# Patient Record
Sex: FEMALE | Race: WHITE | NOT HISPANIC OR LATINO | Employment: OTHER
[De-identification: names, ages, dates, MRNs, and addresses within clinical notes are randomized per-mention and may not be internally consistent; named-entity substitution may affect disease eponyms.]

---

## 2022-03-14 ENCOUNTER — OFFICE VISIT (OUTPATIENT)
Dept: INTERNAL MEDICINE | Facility: OTHER | Age: 52
End: 2022-03-14
Payer: COMMERCIAL

## 2022-03-14 VITALS
HEIGHT: 66 IN | HEART RATE: 125 BPM | BODY MASS INDEX: 47.09 KG/M2 | OXYGEN SATURATION: 97 % | WEIGHT: 293 LBS | TEMPERATURE: 96.9 F | SYSTOLIC BLOOD PRESSURE: 127 MMHG | DIASTOLIC BLOOD PRESSURE: 78 MMHG

## 2022-03-14 DIAGNOSIS — L05.91 PILONIDAL CYST: ICD-10-CM

## 2022-03-14 DIAGNOSIS — Z76.89 ENCOUNTER TO ESTABLISH CARE: ICD-10-CM

## 2022-03-14 DIAGNOSIS — J45.901 REACTIVE AIRWAY DISEASE WITH ACUTE EXACERBATION, UNSPECIFIED ASTHMA SEVERITY, UNSPECIFIED WHETHER PERSISTENT: ICD-10-CM

## 2022-03-14 DIAGNOSIS — Z00.00 PREVENTATIVE HEALTH CARE: ICD-10-CM

## 2022-03-14 DIAGNOSIS — E11.9 TYPE 2 DIABETES MELLITUS WITHOUT COMPLICATION, WITHOUT LONG-TERM CURRENT USE OF INSULIN (HCC): ICD-10-CM

## 2022-03-14 DIAGNOSIS — J30.89 ENVIRONMENTAL AND SEASONAL ALLERGIES: ICD-10-CM

## 2022-03-14 DIAGNOSIS — Z87.898 HISTORY OF CANNABIS VAPING: ICD-10-CM

## 2022-03-14 DIAGNOSIS — E78.5 DYSLIPIDEMIA: ICD-10-CM

## 2022-03-14 DIAGNOSIS — I10 PRIMARY HYPERTENSION: ICD-10-CM

## 2022-03-14 DIAGNOSIS — Z12.31 ENCOUNTER FOR SCREENING MAMMOGRAM FOR BREAST CANCER: ICD-10-CM

## 2022-03-14 DIAGNOSIS — E66.01 CLASS 3 SEVERE OBESITY DUE TO EXCESS CALORIES WITH SERIOUS COMORBIDITY AND BODY MASS INDEX (BMI) OF 50.0 TO 59.9 IN ADULT (HCC): ICD-10-CM

## 2022-03-14 PROCEDURE — 99204 OFFICE O/P NEW MOD 45 MIN: CPT | Mod: GC | Performed by: STUDENT IN AN ORGANIZED HEALTH CARE EDUCATION/TRAINING PROGRAM

## 2022-03-14 RX ORDER — LANCETS 33 GAUGE
EACH MISCELLANEOUS
COMMUNITY
Start: 2022-03-09

## 2022-03-14 RX ORDER — PREDNISONE 50 MG/1
TABLET ORAL
COMMUNITY
Start: 2022-03-09 | End: 2022-03-14

## 2022-03-14 RX ORDER — LOSARTAN POTASSIUM 50 MG/1
50 TABLET ORAL DAILY
COMMUNITY
Start: 2022-03-09 | End: 2022-04-07 | Stop reason: SDUPTHER

## 2022-03-14 RX ORDER — INSULIN HUMAN 100 [IU]/ML
INJECTION, SUSPENSION SUBCUTANEOUS
COMMUNITY
Start: 2022-03-11 | End: 2022-03-15

## 2022-03-14 RX ORDER — CLINDAMYCIN HYDROCHLORIDE 300 MG/1
CAPSULE ORAL
COMMUNITY
Start: 2022-03-09 | End: 2022-03-15

## 2022-03-14 RX ORDER — FLUTICASONE PROPIONATE 50 MCG
SPRAY, SUSPENSION (ML) NASAL
COMMUNITY
Start: 2022-03-09

## 2022-03-14 RX ORDER — ALBUTEROL SULFATE 90 UG/1
AEROSOL, METERED RESPIRATORY (INHALATION)
COMMUNITY
Start: 2022-03-09 | End: 2022-04-07 | Stop reason: SDUPTHER

## 2022-03-14 RX ORDER — ATORVASTATIN CALCIUM 10 MG/1
10 TABLET, FILM COATED ORAL
COMMUNITY
Start: 2022-03-09 | End: 2022-04-07 | Stop reason: SDUPTHER

## 2022-03-14 RX ORDER — BLOOD-GLUCOSE METER
EACH MISCELLANEOUS
COMMUNITY
Start: 2022-03-09

## 2022-03-14 RX ORDER — INSULIN LISPRO 100 [IU]/ML
5 INJECTION, SOLUTION INTRAVENOUS; SUBCUTANEOUS
COMMUNITY
Start: 2022-03-09

## 2022-03-14 RX ORDER — PEN NEEDLE, DIABETIC 32GX 5/32"
NEEDLE, DISPOSABLE MISCELLANEOUS
COMMUNITY
Start: 2022-03-09 | End: 2022-07-12 | Stop reason: RX

## 2022-03-14 RX ORDER — MONTELUKAST SODIUM 10 MG/1
10 TABLET ORAL DAILY
COMMUNITY
Start: 2022-03-09 | End: 2022-04-07 | Stop reason: SDUPTHER

## 2022-03-14 RX ORDER — BLOOD SUGAR DIAGNOSTIC
STRIP MISCELLANEOUS
COMMUNITY
Start: 2022-03-09 | End: 2022-07-28 | Stop reason: SDUPTHER

## 2022-03-14 RX ORDER — CLINDAMYCIN HYDROCHLORIDE 150 MG/1
CAPSULE ORAL
COMMUNITY
Start: 2022-02-22 | End: 2022-03-14

## 2022-03-14 ASSESSMENT — PATIENT HEALTH QUESTIONNAIRE - PHQ9: CLINICAL INTERPRETATION OF PHQ2 SCORE: 0

## 2022-03-14 NOTE — PATIENT INSTRUCTIONS
"1. Diabetes: We are going to change from \"humulin\" to \"lantus\" which is a longer acting insulin you will still take in the morning. You will increase that dosage from 35U to 40U every morning. Continue to take 10U every meal, please watch your glucose and call if your glucose is running <120 consistently.  2. We will start a medication called metformin at 500 mg twice a day, it may cause some upset stomach and diarrhea which does improve often on its own, but if it does not please let us know and we will stop it. After 1 month, we will increase that dosage to 1000 mg BID for now  3. For your breathing issues, we are unsure if this truly represents asthma. We are going to order some pulmonary function tests to clarify this diagnosis and if you don't get a call for an appt in a week, please call to let us know  4. For your breathing medications, we want you to use albuterol as needed every 6 hours for shortness of breath or wheezing/breathing hard and take a log of how often you are using it during the day and at night.  5. Please work on exercise and diet as you can  6. See wound care in next few days  7. We will see you in 1 week, you can get flu and TDAP shots if you are still well after that appointment. Bring in a log of your blood pressures once in the morning and once in the afternoon to that appointment and with your cuff.  "
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
## 2022-03-15 ENCOUNTER — TELEPHONE (OUTPATIENT)
Dept: INTERNAL MEDICINE | Facility: OTHER | Age: 52
End: 2022-03-15

## 2022-03-15 PROBLEM — Z87.898 HISTORY OF CANNABIS VAPING: Status: ACTIVE | Noted: 2022-03-15

## 2022-03-15 PROBLEM — E11.9 TYPE 2 DIABETES MELLITUS WITHOUT COMPLICATION, WITHOUT LONG-TERM CURRENT USE OF INSULIN (HCC): Status: ACTIVE | Noted: 2022-03-15

## 2022-03-15 PROBLEM — J45.901 REACTIVE AIRWAY DISEASE WITH ACUTE EXACERBATION: Status: ACTIVE | Noted: 2022-03-15

## 2022-03-15 PROBLEM — E66.01 CLASS 3 SEVERE OBESITY DUE TO EXCESS CALORIES WITH SERIOUS COMORBIDITY AND BODY MASS INDEX (BMI) OF 50.0 TO 59.9 IN ADULT (HCC): Status: ACTIVE | Noted: 2022-03-15

## 2022-03-15 PROBLEM — E78.5 DYSLIPIDEMIA: Status: ACTIVE | Noted: 2022-03-15

## 2022-03-15 PROBLEM — I10 PRIMARY HYPERTENSION: Status: ACTIVE | Noted: 2022-03-15

## 2022-03-15 PROBLEM — Z00.00 PREVENTATIVE HEALTH CARE: Status: ACTIVE | Noted: 2022-03-15

## 2022-03-15 PROBLEM — L05.91 PILONIDAL CYST: Status: ACTIVE | Noted: 2022-03-15

## 2022-03-15 PROBLEM — J30.89 ENVIRONMENTAL AND SEASONAL ALLERGIES: Status: ACTIVE | Noted: 2022-03-15

## 2022-03-15 RX ORDER — INSULIN GLARGINE 100 [IU]/ML
40 INJECTION, SOLUTION SUBCUTANEOUS EVERY MORNING
Qty: 3 ML | Refills: 2 | Status: SHIPPED | OUTPATIENT
Start: 2022-03-15 | End: 2022-03-22 | Stop reason: SDUPTHER

## 2022-03-15 ASSESSMENT — ENCOUNTER SYMPTOMS
DEPRESSION: 0
COUGH: 0
FEVER: 0
SHORTNESS OF BREATH: 0
NERVOUS/ANXIOUS: 1
WEIGHT LOSS: 1
CHILLS: 0
WHEEZING: 1

## 2022-03-15 NOTE — ASSESSMENT & PLAN NOTE
Patient has severe obesity with BMI greater than 50  -This is complicated by type 2 diabetes, is unclear if this is complicated by dyslipidemia and hypertension    Plan:  -We will follow complications in future visits  -Discussed with patient regarding diet and exercise

## 2022-03-15 NOTE — ASSESSMENT & PLAN NOTE
Patient has a lesion on her left buttock gluteal fold most consistent with pilonidal cyst, has been finishing antibiotics and does not appear to be active infection  -However given diabetes, high risk for infection and transition to abscess.  She has had this before and it went away on its own afterr time    Plan:  -Wound care to follow  -If significant worsening or recurrence, will definitely need colorectal surgery for assessment

## 2022-03-15 NOTE — ASSESSMENT & PLAN NOTE
No lipid panel noted on [facility] discharge  -Patient put on atorvastatin 10 mg, has no ASCVD history but does have new diagnosis of diabetes    Plan:  -We will repeat lipid panel in 3-6 months and titrate statin to ASCVD risk score

## 2022-03-15 NOTE — ASSESSMENT & PLAN NOTE
Mammogram to be done in November when patient turns 52, Pap smear will need to be done soon as unable to get records according to patient    Tdap and flu patient plans to get sometime next week

## 2022-03-15 NOTE — ASSESSMENT & PLAN NOTE
Patient has new diagnosis of type 2 diabetes (presumptive) with A1c of 14 documented 3/6/2022 without prior history  -She just finished steroid course for reactive airway disease, her glucose runs above 275 even on current insulin however is lower in the afternoon  -Diabetes educator in the hospital, on 10 units Humalog per meal and 35 units Humulin in the morning, has blood glucose supplies  -Patient has no feet pain and denies ulcers, does denies any polyurea/polydipsia, has no visual issues  -Sees optometrist, last was 6 months ago    Plan:  -We will replace Humulin KwikPen with Lantus pen but increased dose from 35 units to 40 units  -We will start metformin at 500 mg BID for 1 month then increase to 1000 mg BID thereafter  -We will follow up with optometrist as soon as possible for diabetic eye exam  -We will need foot exam in the future  -We will order microalbumin to creatinine ratio  -Good candidate for GLP1 in 3 months with eventual goal to wean off insulin

## 2022-03-15 NOTE — TELEPHONE ENCOUNTER
Metformin Refill    Last seen: 3/15/22 by Dr. Wilder  Next appt: 3/21/22 with Dr. Duarte    Was the patient seen in the last year in this department? Yes   Does patient have an active prescription for medications requested? No   Received Request Via: Pharmacy

## 2022-03-15 NOTE — ASSESSMENT & PLAN NOTE
Diagnosed during hospitalization  -Put on losartan 50 mg, blood pressure 127/78 today, lower at home 's with her home cuff  -Unclear if this is true diagnosis    Plan:  -Patient to watch her blood pressures with log at next visit, then can attempt titrating down losartan as tolerated/needed

## 2022-03-18 ENCOUNTER — TELEPHONE (OUTPATIENT)
Dept: INTERNAL MEDICINE | Facility: OTHER | Age: 52
End: 2022-03-18

## 2022-03-18 NOTE — TELEPHONE ENCOUNTER
Discussed with patient, she will get labs done next week. Gave her number for wound care, she is doing well right now though without any specific complaints.    Will follow up with us 3/21 for wound check and followup

## 2022-03-21 ENCOUNTER — OFFICE VISIT (OUTPATIENT)
Dept: INTERNAL MEDICINE | Facility: OTHER | Age: 52
End: 2022-03-21
Payer: COMMERCIAL

## 2022-03-21 VITALS
OXYGEN SATURATION: 95 % | WEIGHT: 293 LBS | HEART RATE: 120 BPM | SYSTOLIC BLOOD PRESSURE: 143 MMHG | BODY MASS INDEX: 47.09 KG/M2 | DIASTOLIC BLOOD PRESSURE: 98 MMHG | HEIGHT: 66 IN | TEMPERATURE: 96.8 F

## 2022-03-21 DIAGNOSIS — S31.829S GLUTEAL CLEFT WOUND, LEFT, SEQUELA: ICD-10-CM

## 2022-03-21 DIAGNOSIS — F06.4 ANXIETY DISORDER DUE TO KNOWN PHYSIOLOGICAL CONDITION: ICD-10-CM

## 2022-03-21 DIAGNOSIS — E11.9 TYPE 2 DIABETES MELLITUS WITHOUT COMPLICATION, WITHOUT LONG-TERM CURRENT USE OF INSULIN (HCC): ICD-10-CM

## 2022-03-21 DIAGNOSIS — R00.0 SINUS TACHYCARDIA: ICD-10-CM

## 2022-03-21 DIAGNOSIS — E66.01 MORBID OBESITY WITH BMI OF 50.0-59.9, ADULT (HCC): ICD-10-CM

## 2022-03-21 PROCEDURE — 99999 EKG: CPT | Mod: GC | Performed by: STUDENT IN AN ORGANIZED HEALTH CARE EDUCATION/TRAINING PROGRAM

## 2022-03-21 PROCEDURE — 93000 ELECTROCARDIOGRAM COMPLETE: CPT | Performed by: STUDENT IN AN ORGANIZED HEALTH CARE EDUCATION/TRAINING PROGRAM

## 2022-03-21 PROCEDURE — 99214 OFFICE O/P EST MOD 30 MIN: CPT | Mod: GC | Performed by: STUDENT IN AN ORGANIZED HEALTH CARE EDUCATION/TRAINING PROGRAM

## 2022-03-21 RX ORDER — FLUOXETINE HYDROCHLORIDE 20 MG/1
20 CAPSULE ORAL DAILY
Qty: 30 CAPSULE | Refills: 1 | Status: SHIPPED | OUTPATIENT
Start: 2022-03-21 | End: 2022-05-23

## 2022-03-21 RX ORDER — LANCING DEVICE/LANCETS
KIT MISCELLANEOUS
Qty: 1 KIT | Refills: 0 | Status: SHIPPED | OUTPATIENT
Start: 2022-03-21

## 2022-03-21 NOTE — PATIENT INSTRUCTIONS
-get lab work  done  -referal to surgery and psychology given, start using medication for anxiety.  -f/u with  with labs in 1 month

## 2022-03-21 NOTE — PROGRESS NOTES
HPI: 51-year-old lady with history of reactive airway disease, dyslipidemia, obesity, type 2 diabetes with an A1c of 14, hypertension presents today for blood pressure and wound check follow-up.  Hypertension: Blood pressure slightly elevated today 143/98, but all her blood pressure readings have been at home with the greatest above range highest being 125 systolics.  Patient states that whenever she is anxious she is having issues with blood pressure and high heart rate.    Generalized anxiety: Patient endorses excessive anxiety over general issues, frequently worrying about them and she acknowledges the same, denies any SI or HI, is willing to talk to her therapist and start medication.  She also notices her blood sugars are out of range whenever she is under stress.    Type 2 diabetes: She is on glargine 40 units and Humalog 10 before meals, says that her fasting sugars are around 1 30-1 40, but postmeal drop persistently to the 90s.  No symptoms of hypoglycemia, denies any neuropathy or vision changes.  She is scheduled to see her ophthalmologist in April.  She is still due on getting her lab work done which were ordered by her PCP .    Left gluteal wound: Started 3 weeks ago, no longer has any discharge.  Patient feels symptomatically improving.  Discussed with patient about weight loss and she prefers to do make her lifestyle changes and work on weight loss at this time.    Rest of the review of systems is negative        Current medicines (including changes today)  Current Outpatient Medications   Medication Sig Dispense Refill   • FLUoxetine (PROZAC) 20 MG Cap Take 1 Capsule by mouth every day. 30 Capsule 1   • glucose blood strip 1 Strip by Other route as needed. 100 Strip 0   • Lancets Misc. (ACCU-CHEK FASTCLIX LANCET) Kit Lancet to check sugars 3 times a day. 1 Kit 0   • insulin glargine (LANTUS SOLOSTAR) 100 UNIT/ML Solution Pen-injector injection Inject 40 Units under the skin every  "morning. ICD10 E11.9 3 mL 2   • metFORMIN (GLUCOPHAGE) 500 MG Tab TAKE 1 TABLET BY MOUTH TWICE DAILY WITH MEALS 180 Tablet 0   • albuterol 108 (90 Base) MCG/ACT Aero Soln inhalation aerosol      • atorvastatin (LIPITOR) 10 MG Tab Take 10 mg by mouth at bedtime.     • Blood Glucose Monitoring Suppl (ONE TOUCH ULTRA 2) w/Device Kit USE FOUR TIMES DAILY AS DIRECTED     • fluticasone (FLONASE) 50 MCG/ACT nasal spray SHAKE LIQUID AND USE 1 SPRAY NASALLY TWICE DAILY     • ONETOUCH ULTRA strip USE ONE STRIP FOUR TIMES DAILY     • HUMALOG KWIKPEN 100 UNIT/ML Solution Pen-injector injection PEN Inject 5 Units under the skin 3 times a day before meals.     • BD PEN NEEDLE AALIYAH 2ND GEN USE ONE NEEDLE FIVE TIMES DAILY FOR INSULIN INJECTION AS DIRECTED     • Lancets (ONETOUCH DELICA PLUS [id number]) Misc USE ONE LANCET FOUR TIMES DAILY     • losartan (COZAAR) 50 MG Tab Take 50 mg by mouth every day.     • montelukast (SINGULAIR) 10 MG Tab Take 10 mg by mouth every day.       No current facility-administered medications for this visit.     She  has no past medical history on file.  She  has no past surgical history on file.  Social History     Tobacco Use   • Smoking status: Never Smoker   • Smokeless tobacco: Never Used   Vaping Use   • Vaping Use: Former   • Start date: 3/11/2020   • Substances: THC   • Devices: Pre-filled or refillable cartridge   Substance Use Topics   • Alcohol use: Never   • Drug use: Not Currently     Types: Marijuana     Comment: over a month ago     Social History     Social History Narrative   • Not on file     No family history on file.  No family status information on file.       ROS  See HPI     Objective:     Physical Exam:  /98 (BP Location: Right arm, Patient Position: Sitting, BP Cuff Size: Adult)   Pulse (!) 120   Temp 36 °C (96.8 °F) (Temporal)   Ht 1.664 m (5' 5.5\")   Wt (!) 143 kg (316 lb 3.2 oz)   SpO2 95%  Body mass index is 51.82 kg/m².  Constitutional: Alert. Well appearing. No " distress.  Skin: Gluteal fold wound gaping noted, no serous discharge however whitish tissue seems to be hanging attached to surrounding soft tissues, no induration.  Eye: Equal, round and reactive to light, conjunctiva clear, lids normal.  ENMT: Moist mucous membranes. Normal dentition. Oropharynx clear.  Neck: Trachea midline, no masses, no thyromegaly. No cervical or supraclavicular lymphadenopathy.  Respiratory: Normal effort. Lungs are clear to auscultation bilaterally.  Cardiovascular: Regular rate and rhythm. Normal S1/S2. No murmurs, rubs or gallops.   Abdomen: Soft, non-tender, non-distended. No masses, no hepatosplenomegaly.  Neuro: Moves all four extremities. No facial droop.  Psych: Answers questions appropriately. Normal affect and mood.      Assessment and Plan:     1. Sinus tachycardia  Heart rate was elevated up to 120s, improved on repeat but still persistently over 100.  EKG done in office revealed sinus tachycardia.  -We will check TSH in addition to other labs ordered during previous visit.  - TSH WITH REFLEX TO FT4; Future  - EKG - Clinic Performed    2. Anxiety disorder due to known physiological condition  And also generalized anxiety, BOGDAN-7 score was 19.  No SI/HI  -Referral to psychology given  -We will start patient on fluoxetine  - Referral to Psychology    3. Gluteal cleft wound, left, sequela  Low concern for Mike's gangrene at this time, wound has whitish tissue hanging in place which potentially needs to be removed to enhance wound healing.  Wound care referral given during last visit.  -Patient will benefit from surgical evaluation hence referral will be provided at this visit.  - Referral to General Surgery    4. Morbid obesity with BMI of 50.0-59.9, adult (HCC)  BMI greater than 50, patient states she is trying to lose weight on her own last 100 pounds in about 1 year.  Denies nutrition services at this time  -Follow-up with PCP with lab work    5. Type 2 diabetes mellitus  without complication, without long-term current use of insulin ([facility])  A1c of 14 on 3/6, on glargine 40 units in addition to premeal Humalog 10 units.  Postprandial patient states blood sugars have gone down to 90s.  -Recommend cutting down premeal to 5 units, continue 40 units of glargine and Metformin twice daily at this time.  -Patient to follow-up with ophthalmology for eye evaluation in April.  -We will follow up with PCP for further monitoring with lab work.        Follow up: Return in about 1 month (around 4/21/2022).         PLEASE NOTE: This note was created using voice recognition software.

## 2022-03-25 DIAGNOSIS — E11.9 TYPE 2 DIABETES MELLITUS WITHOUT COMPLICATION, WITHOUT LONG-TERM CURRENT USE OF INSULIN (HCC): ICD-10-CM

## 2022-03-25 NOTE — TELEPHONE ENCOUNTER
Glucose Blood strip    Last seen: 3/21/22 by Dr. Montalvo  Next appt: 4/18/22 with Dr. Wilder    Was the patient seen in the last year in this department? Yes   Does patient have an active prescription for medications requested? No   Received Request Via: Pharmacy

## 2022-03-29 RX ORDER — INSULIN GLARGINE 100 [IU]/ML
40 INJECTION, SOLUTION SUBCUTANEOUS EVERY MORNING
Qty: 15 ML | Refills: 3 | Status: SHIPPED | OUTPATIENT
Start: 2022-03-29 | End: 2022-04-25 | Stop reason: SDUPTHER

## 2022-03-29 NOTE — TELEPHONE ENCOUNTER
Insulin Glargine     Last seen: 3/21/22 by Dr. Montalvo  Next appt: 4/18/22 with Dr. Wilder    Was the patient seen in the last year in this department? Yes   Does patient have an active prescription for medications requested? No   Received Request Via: Pharmacy

## 2022-03-31 ENCOUNTER — HOSPITAL ENCOUNTER (OUTPATIENT)
Dept: LAB | Facility: MEDICAL CENTER | Age: 52
End: 2022-03-31
Attending: STUDENT IN AN ORGANIZED HEALTH CARE EDUCATION/TRAINING PROGRAM
Payer: COMMERCIAL

## 2022-03-31 DIAGNOSIS — R00.0 SINUS TACHYCARDIA: ICD-10-CM

## 2022-03-31 LAB — TSH SERPL DL<=0.005 MIU/L-ACNC: 1.7 UIU/ML (ref 0.38–5.33)

## 2022-03-31 PROCEDURE — 84443 ASSAY THYROID STIM HORMONE: CPT

## 2022-03-31 PROCEDURE — 36415 COLL VENOUS BLD VENIPUNCTURE: CPT

## 2022-04-07 ENCOUNTER — PATIENT MESSAGE (OUTPATIENT)
Dept: INTERNAL MEDICINE | Facility: OTHER | Age: 52
End: 2022-04-07

## 2022-04-07 RX ORDER — LOSARTAN POTASSIUM 50 MG/1
50 TABLET ORAL DAILY
Qty: 90 TABLET | Refills: 1 | Status: SHIPPED | OUTPATIENT
Start: 2022-04-07 | End: 2022-10-06 | Stop reason: SDUPTHER

## 2022-04-07 RX ORDER — ATORVASTATIN CALCIUM 10 MG/1
10 TABLET, FILM COATED ORAL
Qty: 90 TABLET | Refills: 1 | Status: SHIPPED | OUTPATIENT
Start: 2022-04-07 | End: 2022-10-06 | Stop reason: SDUPTHER

## 2022-04-07 RX ORDER — ALBUTEROL SULFATE 90 UG/1
1 AEROSOL, METERED RESPIRATORY (INHALATION) EVERY 6 HOURS PRN
Qty: 8.5 G | Refills: 2 | Status: SHIPPED | OUTPATIENT
Start: 2022-04-07 | End: 2022-04-25 | Stop reason: SDUPTHER

## 2022-04-07 RX ORDER — MONTELUKAST SODIUM 10 MG/1
10 TABLET ORAL DAILY
Qty: 90 TABLET | Refills: 1 | Status: SHIPPED | OUTPATIENT
Start: 2022-04-07 | End: 2022-10-06 | Stop reason: SDUPTHER

## 2022-04-11 DIAGNOSIS — E11.9 TYPE 2 DIABETES MELLITUS WITHOUT COMPLICATION, WITHOUT LONG-TERM CURRENT USE OF INSULIN (HCC): ICD-10-CM

## 2022-04-11 NOTE — TELEPHONE ENCOUNTER
Last seen: 03/21/2022 by Dr. Montalvo  Next appt: 04/18/2022 with Dr. Wilder    Was the patient seen in the last year in this department? Yes   Does patient have an active prescription for medications requested? No   Received Request Via: Pharmacy

## 2022-04-14 ENCOUNTER — HOSPITAL ENCOUNTER (OUTPATIENT)
Dept: LAB | Facility: MEDICAL CENTER | Age: 52
End: 2022-04-14
Attending: STUDENT IN AN ORGANIZED HEALTH CARE EDUCATION/TRAINING PROGRAM
Payer: COMMERCIAL

## 2022-04-14 DIAGNOSIS — E78.5 DYSLIPIDEMIA: ICD-10-CM

## 2022-04-14 DIAGNOSIS — E11.9 TYPE 2 DIABETES MELLITUS WITHOUT COMPLICATION, WITHOUT LONG-TERM CURRENT USE OF INSULIN (HCC): ICD-10-CM

## 2022-04-14 LAB
ALBUMIN SERPL BCP-MCNC: 4.6 G/DL (ref 3.2–4.9)
ALBUMIN/GLOB SERPL: 1.7 G/DL
ALP SERPL-CCNC: 101 U/L (ref 30–99)
ALT SERPL-CCNC: 35 U/L (ref 2–50)
ANION GAP SERPL CALC-SCNC: 14 MMOL/L (ref 7–16)
AST SERPL-CCNC: 20 U/L (ref 12–45)
BILIRUB SERPL-MCNC: 0.6 MG/DL (ref 0.1–1.5)
BUN SERPL-MCNC: 10 MG/DL (ref 8–22)
CALCIUM SERPL-MCNC: 9.5 MG/DL (ref 8.5–10.5)
CHLORIDE SERPL-SCNC: 101 MMOL/L (ref 96–112)
CHOLEST SERPL-MCNC: 180 MG/DL (ref 100–199)
CO2 SERPL-SCNC: 25 MMOL/L (ref 20–33)
CREAT SERPL-MCNC: 0.48 MG/DL (ref 0.5–1.4)
CREAT UR-MCNC: 140.74 MG/DL
GFR SERPLBLD CREATININE-BSD FMLA CKD-EPI: 116 ML/MIN/1.73 M 2
GLOBULIN SER CALC-MCNC: 2.7 G/DL (ref 1.9–3.5)
GLUCOSE SERPL-MCNC: 148 MG/DL (ref 65–99)
HDLC SERPL-MCNC: 50 MG/DL
LDLC SERPL CALC-MCNC: 114 MG/DL
MICROALBUMIN UR-MCNC: <1.2 MG/DL
MICROALBUMIN/CREAT UR: NORMAL MG/G (ref 0–30)
POTASSIUM SERPL-SCNC: 4.1 MMOL/L (ref 3.6–5.5)
PROT SERPL-MCNC: 7.3 G/DL (ref 6–8.2)
SODIUM SERPL-SCNC: 140 MMOL/L (ref 135–145)
TRIGL SERPL-MCNC: 80 MG/DL (ref 0–149)

## 2022-04-14 PROCEDURE — 82043 UR ALBUMIN QUANTITATIVE: CPT

## 2022-04-14 PROCEDURE — 80053 COMPREHEN METABOLIC PANEL: CPT

## 2022-04-14 PROCEDURE — 82570 ASSAY OF URINE CREATININE: CPT

## 2022-04-14 PROCEDURE — 80061 LIPID PANEL: CPT

## 2022-04-14 PROCEDURE — 36415 COLL VENOUS BLD VENIPUNCTURE: CPT

## 2022-04-25 DIAGNOSIS — E11.9 TYPE 2 DIABETES MELLITUS WITHOUT COMPLICATION, WITHOUT LONG-TERM CURRENT USE OF INSULIN (HCC): ICD-10-CM

## 2022-04-25 RX ORDER — ALBUTEROL SULFATE 90 UG/1
1 AEROSOL, METERED RESPIRATORY (INHALATION) EVERY 6 HOURS PRN
Qty: 8.5 G | Refills: 2 | Status: SHIPPED | OUTPATIENT
Start: 2022-04-25 | End: 2022-07-06 | Stop reason: SDUPTHER

## 2022-04-25 RX ORDER — INSULIN GLARGINE 100 [IU]/ML
40 INJECTION, SOLUTION SUBCUTANEOUS EVERY MORNING
Qty: 15 ML | Refills: 3 | Status: SHIPPED | OUTPATIENT
Start: 2022-04-25

## 2022-04-25 NOTE — TELEPHONE ENCOUNTER
Glucose blood strip, albuterol, and lantus Refill    Last seen: 3/21/22 by Dr. Montalvo  Next appt: 5/24/22 with Dr. Wilder    Was the patient seen in the last year in this department? Yes   Does patient have an active prescription for medications requested? No   Received Request Via: Pharmacy

## 2022-05-10 DIAGNOSIS — E11.9 TYPE 2 DIABETES MELLITUS WITHOUT COMPLICATION, WITHOUT LONG-TERM CURRENT USE OF INSULIN (HCC): ICD-10-CM

## 2022-05-10 NOTE — TELEPHONE ENCOUNTER
Last seen: 03/21/22 by Dr. Montalvo  Next appt: 05/24/22 with Dr. Wilder    Was the patient seen in the last year in this department? Yes   Does patient have an active prescription for medications requested? No   Received Request Via: Pharmacy

## 2022-05-23 RX ORDER — FLUOXETINE HYDROCHLORIDE 20 MG/1
20 CAPSULE ORAL DAILY
Qty: 90 CAPSULE | Refills: 1 | Status: SHIPPED | OUTPATIENT
Start: 2022-05-23 | End: 2022-07-28 | Stop reason: SDUPTHER

## 2022-05-23 NOTE — TELEPHONE ENCOUNTER
Last seen: 03/21/22 by Dr. Montalov  Next appt: 05/24/22 with Dr. Wilder    Was the patient seen in the last year in this department? Yes   Does patient have an active prescription for medications requested? No   Received Request Via: Pharmacy

## 2022-05-24 ENCOUNTER — OFFICE VISIT (OUTPATIENT)
Dept: INTERNAL MEDICINE | Facility: OTHER | Age: 52
End: 2022-05-24
Payer: COMMERCIAL

## 2022-05-24 VITALS
WEIGHT: 293 LBS | TEMPERATURE: 98.4 F | HEART RATE: 106 BPM | DIASTOLIC BLOOD PRESSURE: 82 MMHG | HEIGHT: 66 IN | OXYGEN SATURATION: 97 % | BODY MASS INDEX: 47.09 KG/M2 | SYSTOLIC BLOOD PRESSURE: 127 MMHG

## 2022-05-24 DIAGNOSIS — E78.5 DYSLIPIDEMIA: ICD-10-CM

## 2022-05-24 DIAGNOSIS — E11.9 TYPE 2 DIABETES MELLITUS WITHOUT COMPLICATION, WITHOUT LONG-TERM CURRENT USE OF INSULIN (HCC): ICD-10-CM

## 2022-05-24 DIAGNOSIS — E66.01 CLASS 3 SEVERE OBESITY DUE TO EXCESS CALORIES WITH SERIOUS COMORBIDITY AND BODY MASS INDEX (BMI) OF 50.0 TO 59.9 IN ADULT (HCC): ICD-10-CM

## 2022-05-24 DIAGNOSIS — F41.9 ANXIETY: ICD-10-CM

## 2022-05-24 DIAGNOSIS — Z00.00 PREVENTATIVE HEALTH CARE: ICD-10-CM

## 2022-05-24 DIAGNOSIS — R00.0 TACHYCARDIA: ICD-10-CM

## 2022-05-24 DIAGNOSIS — I10 PRIMARY HYPERTENSION: ICD-10-CM

## 2022-05-24 PROCEDURE — 99213 OFFICE O/P EST LOW 20 MIN: CPT | Mod: GE | Performed by: STUDENT IN AN ORGANIZED HEALTH CARE EDUCATION/TRAINING PROGRAM

## 2022-05-24 RX ORDER — AZITHROMYCIN 250 MG/1
TABLET, FILM COATED ORAL
COMMUNITY
End: 2022-05-24

## 2022-05-24 RX ORDER — CETIRIZINE HYDROCHLORIDE 10 MG/1
TABLET ORAL
COMMUNITY

## 2022-05-24 RX ORDER — AZELASTINE 1 MG/ML
SPRAY, METERED NASAL
COMMUNITY

## 2022-05-24 NOTE — PATIENT INSTRUCTIONS
Good job on exercise and diet, keep up the good work! We will increase metformin to 1000 mg BID, please get the A1C lab done in 3 weeks (past 3 months) and we can contact you regarding results after if any concern.     Please watch for GI side effects on the increase metformin, decrease if can not tolerate.    Please get PPSV23 and Tdap shots done.    Bring log of BP to next visit for last 10-14 days.

## 2022-05-24 NOTE — PROGRESS NOTES
Established Patient    Jessica Gay is a 51 y.o. female with a PMHx of obesity, type 2 diabetes A1C 14, HTN and reactive airway disease who presents today for followup.    HPI:     Type 2 Diabetes:  -Last A1C 3/6 was 14, Rarely going over 150 with glucose meter, changing diet a lot with <100 carbs per day with lots more fruits/vegetables, no lower than 81 and not symptomatic  -metformin 500 BID, lantus 40U every morning, humalog 5 units TID  -tolerating metformin with no diarrhea as long as takes it with food  -concerned about neuropathy, foot exam today  -Patient saw eye doctor (Debora) in April, had 2 spots but went again 5/16 and repeat exam showed no issues  -Labs reviewed, already on statin, no elevated microalb:cr <1.2 4/14/22    HTN:  -127/82, runs 114/76 at home    Elevated HR:  -History of this, now 106 today, thinks it is getting better with exercise    Obesity:  -Exercising every day 30 mins walk, on top of diet  -No weight change yet, still 306 lbs    Anxiety:  -Feeling better with dietary changes and lifestyle improvements  -Thinks these changes may be more helpful than fluoxetine but hard to say, not interested in psychology as doing better    Gluteal wound:  -resolved, cleaned up shortly after last visit and did not need wound care, and did not need general surgery    Primary Care:  -Due for pap smear in November, will be doing in [place]  -Due for tdap, PNA, hep B, covid booster    ROS: As per HPI. Additional pertinent symptoms as noted below.    Review of Systems   Constitutional: Negative for chills and fever.   Eyes: Negative for blurred vision, double vision and photophobia.   Respiratory: Negative for cough and shortness of breath.    Cardiovascular: Negative for chest pain and leg swelling.   Gastrointestinal: Negative for abdominal pain, nausea and vomiting.   Skin: Negative for itching and rash.   Neurological: Positive for tingling and sensory change. Negative for weakness.    Psychiatric/Behavioral: The patient is nervous/anxious.        Patient Active Problem List    Diagnosis Date Noted   • Morbid obesity with BMI of 50.0-59.9, adult ([facility]) 03/21/2022   • Reactive airway disease with acute exacerbation 03/15/2022   • History of cannabis vaping 03/15/2022   • Environmental and seasonal allergies 03/15/2022   • Dyslipidemia 03/15/2022   • Type 2 diabetes mellitus without complication, without long-term current use of insulin ([facility]) 03/15/2022   • Class 3 severe obesity due to excess calories with serious comorbidity and body mass index (BMI) of 50.0 to 59.9 in adult ([facility]) 03/15/2022   • Primary hypertension 03/15/2022   • Pilonidal cyst 03/15/2022   • Preventative health care 03/15/2022       Social History     Tobacco Use   • Smoking status: Never Smoker   • Smokeless tobacco: Never Used   Vaping Use   • Vaping Use: Former   • Start date: 3/11/2020   • Substances: THC   • Devices: Pre-filled or refillable cartridge   Substance Use Topics   • Alcohol use: Never   • Drug use: Not Currently     Types: Marijuana     Comment: over a month ago       Current Outpatient Medications   Medication Sig Dispense Refill   • azelastine (ASTELIN) 137 MCG/SPRAY nasal spray azelastine 137 mcg (0.1 %) nasal spray aerosol   SPRAY 2 SPRAY(S) TWICE A DAY BY INTRANASAL ROUTE FOR 5 DAYS.     • cetirizine (ZYRTEC) 10 MG Tab cetirizine 10 mg tablet   TAKE 1 TABLET BY MOUTH EVERY DAY     • FLUoxetine (PROZAC) 20 MG Cap TAKE 1 CAPSULE BY MOUTH EVERY DAY 90 Capsule 1   • glucose blood strip 1 Strip by Other route 3 times a day. 300 Strip 2   • insulin glargine (LANTUS SOLOSTAR) 100 UNIT/ML Solution Pen-injector injection Inject 40 Units under the skin every morning. ICD10 E11.9 15 mL 3   • albuterol 108 (90 Base) MCG/ACT Aero Soln inhalation aerosol Inhale 1 Puff every 6 hours as needed for Shortness of Breath. 8.5 g 2   • metFORMIN (GLUCOPHAGE) 500 MG Tab Take 1 Tablet by mouth 2 times a day with meals. 180 Tablet  "0   • montelukast (SINGULAIR) 10 MG Tab Take 1 Tablet by mouth every day. 90 Tablet 1   • losartan (COZAAR) 50 MG Tab Take 1 Tablet by mouth every day. 90 Tablet 1   • atorvastatin (LIPITOR) 10 MG Tab Take 1 Tablet by mouth at bedtime. 90 Tablet 1   • Lancets Misc. (ACCU-CHEK FASTCLIX LANCET) Kit Lancet to check sugars 3 times a day. 1 Kit 0   • Blood Glucose Monitoring Suppl (ONE TOUCH ULTRA 2) w/Device Kit USE FOUR TIMES DAILY AS DIRECTED     • fluticasone (FLONASE) 50 MCG/ACT nasal spray SHAKE LIQUID AND USE 1 SPRAY NASALLY TWICE DAILY     • ONETOUCH ULTRA strip USE ONE STRIP FOUR TIMES DAILY     • HUMALOG KWIKPEN 100 UNIT/ML Solution Pen-injector injection PEN Inject 5 Units under the skin 3 times a day before meals.     • BD PEN NEEDLE AALIYAH 2ND GEN USE ONE NEEDLE FIVE TIMES DAILY FOR INSULIN INJECTION AS DIRECTED     • Lancets (ONETOUCH DELICA PLUS [id number]) Misc USE ONE LANCET FOUR TIMES DAILY     • azithromycin (ZITHROMAX) 250 MG Tab azithromycin 250 mg tablet   TAKE 2 TABLETS BY MOUTH TODAY, THEN TAKE 1 TABLET DAILY FOR 4 DAYS (Patient not taking: Reported on 5/24/2022)       No current facility-administered medications for this visit.       /82 (BP Location: Left arm, Patient Position: Sitting, BP Cuff Size: Adult)   Pulse (!) 106   Temp 36.9 °C (98.4 °F) (Temporal)   Ht 1.676 m (5' 6\")   Wt (!) 139 kg (306 lb 9.6 oz)   SpO2 97%   BMI 49.49 kg/m²     Physical Exam  Physical Exam  Constitutional:       General: She is not in acute distress.     Appearance: Normal appearance. She is obese.      Comments: Patient does have audible breathing sounds with deep respiration   HENT:      Head: Normocephalic and atraumatic.      Mouth/Throat:      Mouth: Mucous membranes are moist.      Pharynx: Oropharynx is clear. No oropharyngeal exudate or posterior oropharyngeal erythema.   Cardiovascular:      Rate and Rhythm: Normal rate and regular rhythm.      Pulses:           Dorsalis pedis pulses are 2+ on " the right side and 2+ on the left side.        Posterior tibial pulses are 2+ on the right side and 2+ on the left side.      Heart sounds: No murmur heard.    No gallop.      Comments: Extra beats noted with respiration  Pulmonary:      Effort: Pulmonary effort is normal. No respiratory distress.      Breath sounds: No wheezing or rales.      Comments: Generally diminished due to body habitus  Abdominal:      General: Abdomen is flat. Bowel sounds are normal. There is no distension.      Palpations: Abdomen is soft.      Tenderness: There is no abdominal tenderness.   Musculoskeletal:         General: No swelling. Normal range of motion.   Feet:      Right foot:      Protective Sensation: 5 sites tested. 5 sites sensed.      Skin integrity: Skin integrity normal.      Toenail Condition: Right toenails are normal.      Left foot:      Protective Sensation: 5 sites tested. 5 sites sensed.      Skin integrity: Skin integrity normal.      Toenail Condition: Left toenails are normal.   Skin:     General: Skin is warm and dry.      Capillary Refill: Capillary refill takes less than 2 seconds.      Findings: No erythema or rash.   Neurological:      General: No focal deficit present.      Mental Status: She is alert and oriented to person, place, and time.   Psychiatric:         Mood and Affect: Mood normal.         Behavior: Behavior normal.       Assessment and Plan    Anxiety  Patient admits to anxiety  -improving with lifestyle changes    Plan:  -continue lifestyle changes  -fluoxetine 20 mg  -BOGDAN to quantify at future visit    Class 3 severe obesity due to excess calories with serious comorbidity and body mass index (BMI) of 50.0 to 59.9 in adult (HCC)  Patient has severe obesity with BMI greater than 50  -This is complicated by type 2 diabetes, HTN and dyslipidemia    Plan:  -Patient working on diet and exercise, feeling better but no major weight change yet    Dyslipidemia  ASCVD 2.8% on atorvastatin 10 mg  -will  continue statin for now until obesity and diet improves, then re-assess and see if benefits from trial off    Preventative health care  Mammogram later this year  Pap smear due later this year, will be getting in [place]  Shots: TDAP, PNA (told patient PPSV23 but can be replaced by PCV25), HBV, covid booster    Primary hypertension  Dx in recent hospitalization  -127/82 on losartan 50 mg, controlled at this time (no microalb:Cr elevation noted but on meds at the time)    Plan:  -BP log to follow at future visits so can keep on optimal dosing    Tachycardia  Hx of elevated HR  -TSH normal, EKG shows sinus tachycardia  -thought to be due to obesity, asx    Type 2 diabetes mellitus without complication, without long-term current use of insulin (HCC)  Patient has new diagnosis of type 2 diabetes (presumptive) with A1c of 14 documented 3/6/2022 without prior history without known complications  -Patient had some tingling in feet but negative monofilament exam today  -negative diabetic eye examination    Plan:  -Continue lantus 40U, metformin 1000 BID  -Continue losartan for HTN, no microalb:cr noted but on meds  -Good candidate for GLP1 over SGLT2 after A1C assessment next visit with eventual goal to wean off insulin    Follow up: 5 weeks    Signed by: Parag Wilder D.O.

## 2022-05-28 PROBLEM — F41.9 ANXIETY: Status: ACTIVE | Noted: 2022-05-28

## 2022-05-28 PROBLEM — R00.0 TACHYCARDIA: Status: ACTIVE | Noted: 2022-05-28

## 2022-05-28 PROBLEM — L05.91 PILONIDAL CYST: Status: RESOLVED | Noted: 2022-03-15 | Resolved: 2022-05-28

## 2022-05-28 ASSESSMENT — ENCOUNTER SYMPTOMS
COUGH: 0
NAUSEA: 0
SHORTNESS OF BREATH: 0
DOUBLE VISION: 0
BLURRED VISION: 0
NERVOUS/ANXIOUS: 1
VOMITING: 0
SENSORY CHANGE: 1
ABDOMINAL PAIN: 0
TINGLING: 1
CHILLS: 0
PHOTOPHOBIA: 0
WEAKNESS: 0
FEVER: 0

## 2022-05-28 NOTE — ASSESSMENT & PLAN NOTE
Patient has severe obesity with BMI greater than 50  -This is complicated by type 2 diabetes, HTN and dyslipidemia    Plan:  -Patient working on diet and exercise, feeling better but no major weight change yet

## 2022-05-28 NOTE — ASSESSMENT & PLAN NOTE
Mammogram later this year  Pap smear due later this year, will be getting in [place]  Shots: TDAP, PNA (told patient PPSV23 but can be replaced by PCV25), HBV, covid booster

## 2022-05-28 NOTE — ASSESSMENT & PLAN NOTE
Dx in recent hospitalization  -127/82 on losartan 50 mg, controlled at this time (no microalb:Cr elevation noted but on meds at the time)    Plan:  -BP log to follow at future visits so can keep on optimal dosing

## 2022-05-28 NOTE — ASSESSMENT & PLAN NOTE
Patient has new diagnosis of type 2 diabetes (presumptive) with A1c of 14 documented 3/6/2022 without prior history without known complications  -Patient had some tingling in feet but negative monofilament exam today  -negative diabetic eye examination    Plan:  -Continue lantus 40U, metformin 1000 BID  -Continue losartan for HTN, no microalb:cr noted but on meds  -Good candidate for GLP1 over SGLT2 after A1C assessment next visit with eventual goal to wean off insulin

## 2022-05-28 NOTE — ASSESSMENT & PLAN NOTE
Patient admits to anxiety  -improving with lifestyle changes    Plan:  -continue lifestyle changes  -fluoxetine 20 mg  -BOGDAN to quantify at future visit

## 2022-05-28 NOTE — ASSESSMENT & PLAN NOTE
ASCVD 2.8% on atorvastatin 10 mg  -will continue statin for now until obesity and diet improves, then re-assess and see if benefits from trial off

## 2022-07-06 RX ORDER — ALBUTEROL SULFATE 90 UG/1
1-2 AEROSOL, METERED RESPIRATORY (INHALATION) EVERY 6 HOURS PRN
Qty: 8.5 G | Refills: 2 | Status: SHIPPED | OUTPATIENT
Start: 2022-07-06 | End: 2022-07-12 | Stop reason: SDUPTHER

## 2022-07-06 NOTE — TELEPHONE ENCOUNTER
Last seen: 5/24/22 by Dr. Wilder Next appt: 7/12/211 Dr. Lay      Does patient have an active prescription for medications requested? No    Received Request Via: Pharmacy

## 2022-07-12 DIAGNOSIS — J45.901 REACTIVE AIRWAY DISEASE WITH ACUTE EXACERBATION, UNSPECIFIED ASTHMA SEVERITY, UNSPECIFIED WHETHER PERSISTENT: ICD-10-CM

## 2022-07-12 DIAGNOSIS — E11.9 TYPE 2 DIABETES MELLITUS WITHOUT COMPLICATION, WITHOUT LONG-TERM CURRENT USE OF INSULIN (HCC): ICD-10-CM

## 2022-07-12 RX ORDER — ALBUTEROL SULFATE 90 UG/1
1-2 AEROSOL, METERED RESPIRATORY (INHALATION) EVERY 6 HOURS PRN
Qty: 8.5 G | Refills: 2 | Status: SHIPPED | OUTPATIENT
Start: 2022-07-12 | End: 2022-09-20

## 2022-07-12 NOTE — TELEPHONE ENCOUNTER
Albuterol Refill    Last seen: 5/24/22 by Dr. Wilder  Next appt: None    Was the patient seen in the last year in this department? Yes   Does patient have an active prescription for medications requested? No   Received Request Via: Pharmacy

## 2022-07-12 NOTE — TELEPHONE ENCOUNTER
Refilled medication albuterol as well as the requested BD gwen 2nd generation pen needles 4 mm x 32g for insulin pen injections from patient. Please call the patient and ask her to make a follow-up appointment within the next month to establish with a new PCP, thank you    Missy Collins MD, MPH  [facility], PGY-3

## 2022-07-27 ENCOUNTER — HOSPITAL ENCOUNTER (OUTPATIENT)
Dept: LAB | Facility: MEDICAL CENTER | Age: 52
End: 2022-07-27
Attending: STUDENT IN AN ORGANIZED HEALTH CARE EDUCATION/TRAINING PROGRAM
Payer: COMMERCIAL

## 2022-07-27 DIAGNOSIS — E11.9 TYPE 2 DIABETES MELLITUS WITHOUT COMPLICATION, WITHOUT LONG-TERM CURRENT USE OF INSULIN (HCC): ICD-10-CM

## 2022-07-27 LAB
EST. AVERAGE GLUCOSE BLD GHB EST-MCNC: 131 MG/DL
HBA1C MFR BLD: 6.2 % (ref 4–5.6)

## 2022-07-27 PROCEDURE — 36415 COLL VENOUS BLD VENIPUNCTURE: CPT

## 2022-07-27 PROCEDURE — 83036 HEMOGLOBIN GLYCOSYLATED A1C: CPT

## 2022-07-28 ENCOUNTER — OFFICE VISIT (OUTPATIENT)
Dept: INTERNAL MEDICINE | Facility: OTHER | Age: 52
End: 2022-07-28
Payer: COMMERCIAL

## 2022-07-28 VITALS
HEART RATE: 101 BPM | TEMPERATURE: 98 F | BODY MASS INDEX: 47.09 KG/M2 | DIASTOLIC BLOOD PRESSURE: 87 MMHG | SYSTOLIC BLOOD PRESSURE: 136 MMHG | OXYGEN SATURATION: 97 % | HEIGHT: 66 IN | WEIGHT: 293 LBS

## 2022-07-28 DIAGNOSIS — F41.9 ANXIETY: ICD-10-CM

## 2022-07-28 DIAGNOSIS — I10 PRIMARY HYPERTENSION: ICD-10-CM

## 2022-07-28 DIAGNOSIS — E11.9 TYPE 2 DIABETES MELLITUS WITHOUT COMPLICATION, WITHOUT LONG-TERM CURRENT USE OF INSULIN (HCC): ICD-10-CM

## 2022-07-28 DIAGNOSIS — E66.01 CLASS 3 SEVERE OBESITY DUE TO EXCESS CALORIES WITH SERIOUS COMORBIDITY AND BODY MASS INDEX (BMI) OF 50.0 TO 59.9 IN ADULT (HCC): ICD-10-CM

## 2022-07-28 DIAGNOSIS — E78.5 DYSLIPIDEMIA: ICD-10-CM

## 2022-07-28 PROCEDURE — 99214 OFFICE O/P EST MOD 30 MIN: CPT | Mod: GC

## 2022-07-28 RX ORDER — FLUOXETINE HYDROCHLORIDE 20 MG/1
20 CAPSULE ORAL DAILY
Qty: 90 CAPSULE | Refills: 1 | Status: SHIPPED | OUTPATIENT
Start: 2022-07-28 | End: 2022-11-22 | Stop reason: SDUPTHER

## 2022-07-28 RX ORDER — COVID-19 MOLECULAR TEST ASSAY
KIT MISCELLANEOUS
COMMUNITY
Start: 2022-06-02

## 2022-07-28 RX ORDER — ATORVASTATIN CALCIUM 20 MG/1
20 TABLET, FILM COATED ORAL NIGHTLY
Qty: 30 TABLET | Refills: 2 | Status: SHIPPED | OUTPATIENT
Start: 2022-07-28 | End: 2022-10-06 | Stop reason: SDUPTHER

## 2022-07-28 RX ORDER — BLOOD SUGAR DIAGNOSTIC
STRIP MISCELLANEOUS
Qty: 120 STRIP | Refills: 1 | Status: SHIPPED | OUTPATIENT
Start: 2022-07-28 | End: 2022-08-10 | Stop reason: SDUPTHER

## 2022-07-28 NOTE — PROGRESS NOTES
Established Patient    Jessica Gay is a 51 y.o. female who presents today with the following Chief Complaint(s): Follow up for There were no encounter diagnoses.    HPI:   Diabetes:   She said that she checks her glucose 4 times a day and that it is usually around 100-120. She is very conscious of her diet and said that she counts calories every day and is eating a well-rounded diet high in vegetables. She is also making an effort to lose weight and goes on walks with her dogs for at least 1/2 hour daily. She said that she initially had some neuropathy but has not had any problems recently, which she attributes to her fluoxetine. She saw an eye doctor in May and will follow up again around November.     Hypertension:   She has been tracking her blood pressure at home and said that it usually runs around 110s/70s. She said that she often feels stressed at the doctor's office and that it is usual for her to have an elevated blood pressure here. She denied headache, vision changes, dizziness, or feeling light headed.     Anxiety:   She is happy with her current regimen and has no complaints at this time.     Restrictive airway disease:   She said that around last October she moved back to [place] and started having difficulty breathing. It had a slow onset but one day she suddenly felt like she couldn't breathe any more. At that time she presented to the ER and her breathing was improved with a breathing treatment. She said she has had multiple types of imaging done and still does not have an explanation for her loud breathing. She said she has an occasional dry cough but has not coughed up anything. Her breathing is improved in higher humidity and is worsened when around grass due to her allergies. Of note, she does not snore and does not have stridor while sleeping. She said her breathing is currently under control. She has an appointment with an ENT in [place] in 2 weeks and will be scoped then.      Tachycardia:   She has a history of tachycardia. She has already had an echo, stress test, and EKG, all of which were normal. She said that she is stressed when at the doctor's office and believes that may be contributing.     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 She also cares for her parents (one in [place] and one in [place]) and is working on purchasing a home for them in [place].     No past medical history on file.  Social History     Tobacco Use   • Smoking status: Never Smoker   • Smokeless tobacco: Never Used   Vaping Use   • Vaping Use: Former   • Start date: 3/11/2020   • Substances: THC   • Devices: Pre-filled or refillable cartridge   Substance Use Topics   • Alcohol use: Never   • Drug use: Not Currently     Types: Marijuana     Comment: over a month ago     Current Outpatient Medications   Medication Sig Dispense Refill   • albuterol 108 (90 Base) MCG/ACT Aero Soln inhalation aerosol Inhale 1-2 Puffs every 6 hours as needed for Shortness of Breath. 8.5 g 2   • Insulin Pen Needle 32 G x 4 mm 1 Each 5 Times a Day. Check blood sugar up to 5 times a day for diabetes 400 Each 3   • azelastine (ASTELIN) 137 MCG/SPRAY nasal spray azelastine 137 mcg (0.1 %) nasal spray aerosol   SPRAY 2 SPRAY(S) TWICE A DAY BY INTRANASAL ROUTE FOR 5 DAYS.     • cetirizine (ZYRTEC) 10 MG Tab cetirizine 10 mg tablet   TAKE 1 TABLET BY MOUTH EVERY DAY     • metFORMIN (GLUCOPHAGE) 1000 MG tablet Take 1 Tablet by mouth 2 times a day with meals. 180 Tablet 1   • FLUoxetine (PROZAC) 20 MG Cap TAKE 1 CAPSULE BY MOUTH EVERY DAY 90 Capsule 1   • glucose blood strip 1 Strip by Other route 3 times a day. 300 Strip 2   • insulin glargine (LANTUS SOLOSTAR) 100 UNIT/ML Solution Pen-injector injection Inject  40 Units under the skin every morning. ICD10 E11.9 15 mL 3   • montelukast (SINGULAIR) 10 MG Tab Take 1 Tablet by mouth every day. 90 Tablet 1   • losartan (COZAAR) 50 MG Tab Take 1 Tablet by mouth every day. 90 Tablet 1   • atorvastatin (LIPITOR) 10 MG Tab Take 1 Tablet by mouth at bedtime. 90 Tablet 1   • Lancets Misc. (ACCU-CHEK FASTCLIX LANCET) Kit Lancet to check sugars 3 times a day. 1 Kit 0   • Blood Glucose Monitoring Suppl (ONE TOUCH ULTRA 2) w/Device Kit USE FOUR TIMES DAILY AS DIRECTED     • fluticasone (FLONASE) 50 MCG/ACT nasal spray SHAKE LIQUID AND USE 1 SPRAY NASALLY TWICE DAILY     • ONETOUCH ULTRA strip USE ONE STRIP FOUR TIMES DAILY     • Lancets (ONETOUCH DELICA PLUS [id number]) Misc USE ONE LANCET FOUR TIMES DAILY     • BINAXNOW COVID-19 AG HOME TEST Kit TEST AS DIRECTED TODAY (Patient not taking: Reported on 7/28/2022)     • HUMALOG KWIKPEN 100 UNIT/ML Solution Pen-injector injection PEN Inject 5 Units under the skin 3 times a day before meals. (Patient not taking: Reported on 7/28/2022)       No current facility-administered medications for this visit.       ROS: As per HPI. Additional pertinent systems as noted below.  Constitutional: Negative for chills and fever.   HENT: Negative for ear pain and sore throat.    Eyes: Negative for discharge and redness.   Respiratory: Negative for hemoptysis or stridor. Positive for occasional dry cough and wheezing.     Cardiovascular: Negative for chest pain, palpitations and leg swelling.   Gastrointestinal: Negative for abdominal pain, constipation, diarrhea, heartburn, nausea and vomiting.   Genitourinary: Negative for dysuria, flank pain and hematuria.   Musculoskeletal: Negative for falls and myalgias.   Skin: Negative for itching and rash.   Neurological: Negative for dizziness, seizures, loss of consciousness and headaches.   Endo/Heme/Allergies: Negative for polydipsia. Does not bruise/bleed easily.   Psychiatric/Behavioral: Negative for substance  "abuse and suicidal ideas.     Physical Exam  /87 (BP Location: Left arm, Patient Position: Sitting, BP Cuff Size: Large adult)   Pulse (!) 101   Temp 36.7 °C (98 °F) (Temporal)   Ht 1.676 m (5' 6\")   Wt (!) 134 kg (295 lb 12.8 oz)   SpO2 97%   BMI 47.74 kg/m²   General:  Alert and oriented, No apparent distress.     Eyes: Pupils equal and reactive. No scleral icterus.    Nose: Moist mucous membranes, no edema or erythema, nasal septum midline     Throat: Clear and moist. No erythema or exudates noted.    Neck: Supple. No lymphadenopathy noted. Thyroid not enlarged.    Lungs: Inspiratory and expiratory stridor heard at all posts, most notable at upper lobes. Bilateral lung expansion.     Cardiovascular: Regular rate and rhythm. No murmurs, rubs or gallops.    Abdomen:  Regular bowel sounds, nontender, no rebound or guarding noted.    Extremities: No clubbing, cyanosis, edema. Monofilament foot test regular.     Skin: Clear. No rash or suspicious skin lesions noted.      Assessment and Plan:     1. Uncontrolled Type 2 Diabetes:  -Last A1C 3/6 was 14. Repeat A1C completed yesterday, no results yet.   -Her average glucose is 119. Rarely going over 140.   -She has changed her diet a lot with <100 carbs per day and lots more fruits/vegetables  -She is currently tolerating her metformin well with only occasional diarrhea.   -Concerns for neuropathy- Foot exam today regular.   -Patient saw eye doctor (Dr. Cazares) in April, had 2 spots but went again 5/16 and repeat exam showed no issues.   -Labs from 4/14 showed no elevated microalb:creatining ratio <1.2   Plan:   - Ordered fasting CMP to be completed before next appointment.   - Continue metformin 500 BID, lantus 40U every morning, humalog 5 units TID  - Patient is controlling blood sugars well. Will consider starting trulicity low dose and backing off of insulin at next visit.      2. Hypertension- Unspecified type   She tracks her blood pressure at home " and reported that it is usually around 110s/70s.   She said that her blood pressure is usually elevated at the doctor's office. Possibly white-coat hypertension.   -Requested that she bring in her blood pressure log to next visit   -Requested that she bring her blood pressure cuff to next appointment to check accuracy.       3. Obesity   She is following a strict diet and going on walks for 30 minutes each day.   Her weight at previous appointment was 306. Decreased to 295 today.   -Patient is making good progress with diet and exercise.   -Will continue to monitor.     4. Hyperlipidemia   Labs from 4/14 showed elevated .  -Ordered Lipid panel to be completed before next appointment   -Increased Atorvastatin from 10 mg to 20 mg.  -Will follow up at next appointment.       5. Anxiety and depression   Currently well controlled   -Refilled fluoxetine 20 mg     Follow up: 2 months     Robetro Carlos Montelongo D.O. PGY I  [facility]

## 2022-07-28 NOTE — PATIENT INSTRUCTIONS
Thank you for visiting today!  Please follow-up in 2 months   Please get lab work done at least 5 days before next visit.  Please try and eat healthy, get at least 30 minutes of cardiovascular exercise a day to help keep your health as best as it can be.    If you have any questions or concerns please feel free to contact us at [phone].  If you feel like you are experiencing a medical emergency please seek immediate medical attention at an urgent care or in the emergency department.

## 2022-08-10 DIAGNOSIS — E78.5 DYSLIPIDEMIA: ICD-10-CM

## 2022-08-10 NOTE — TELEPHONE ENCOUNTER
One Touch Strip    Last seen: 7/28/22 by Dr. Montelongo  Next appt: 9/9/22 with Dr. Montelongo    Was the patient seen in the last year in this department? Yes   Does patient have an active prescription for medications requested? No   Received Request Via: Pharmacy

## 2022-08-12 RX ORDER — BLOOD SUGAR DIAGNOSTIC
STRIP MISCELLANEOUS
Qty: 120 STRIP | Refills: 1 | Status: SHIPPED | OUTPATIENT
Start: 2022-08-12

## 2022-08-29 NOTE — TELEPHONE ENCOUNTER
Last seen: 7/28/22 by Dr. BALL  Next appt:  with      Was the patient seen in the last year in this department? Yes   Does patient have an active prescription for medications requested? No   Received Request Via: Pharmacy

## 2022-09-20 DIAGNOSIS — J45.901 REACTIVE AIRWAY DISEASE WITH ACUTE EXACERBATION, UNSPECIFIED ASTHMA SEVERITY, UNSPECIFIED WHETHER PERSISTENT: ICD-10-CM

## 2022-09-20 RX ORDER — ALBUTEROL SULFATE 90 UG/1
AEROSOL, METERED RESPIRATORY (INHALATION)
Qty: 8.5 G | Refills: 2 | Status: SHIPPED | OUTPATIENT
Start: 2022-09-20

## 2022-09-20 NOTE — TELEPHONE ENCOUNTER
Albuterol refill    Last seen: 7/28/22 by Dr. Montelongo  Next appt: None    Was the patient seen in the last year in this department? Yes   Does patient have an active prescription for medications requested? No   Received Request Via: Pharmacy

## 2022-10-04 DIAGNOSIS — E78.5 DYSLIPIDEMIA: ICD-10-CM

## 2022-10-04 NOTE — TELEPHONE ENCOUNTER
Last seen: 7/28/22 by Dr. lewis  Next appt:  with      Was the patient seen in the last year in this department? Yes   Does patient have an active prescription for medications requested? No   Received Request Via: Pharmacy

## 2022-10-06 DIAGNOSIS — E78.5 DYSLIPIDEMIA: ICD-10-CM

## 2022-10-07 RX ORDER — MONTELUKAST SODIUM 10 MG/1
10 TABLET ORAL DAILY
Qty: 90 TABLET | Refills: 1 | Status: SHIPPED | OUTPATIENT
Start: 2022-10-07

## 2022-10-07 RX ORDER — ATORVASTATIN CALCIUM 20 MG/1
20 TABLET, FILM COATED ORAL NIGHTLY
Qty: 30 TABLET | Refills: 2 | Status: SHIPPED | OUTPATIENT
Start: 2022-10-07 | End: 2022-10-31 | Stop reason: SDUPTHER

## 2022-10-07 RX ORDER — ATORVASTATIN CALCIUM 10 MG/1
10 TABLET, FILM COATED ORAL
Qty: 90 TABLET | Refills: 1 | Status: SHIPPED | OUTPATIENT
Start: 2022-10-07 | End: 2022-11-02

## 2022-10-07 RX ORDER — LOSARTAN POTASSIUM 50 MG/1
50 TABLET ORAL DAILY
Qty: 90 TABLET | Refills: 1 | Status: SHIPPED | OUTPATIENT
Start: 2022-10-07

## 2022-10-07 RX ORDER — ATORVASTATIN CALCIUM 20 MG/1
20 TABLET, FILM COATED ORAL NIGHTLY
Qty: 30 TABLET | Refills: 2 | OUTPATIENT
Start: 2022-10-07

## 2022-10-07 RX ORDER — MONTELUKAST SODIUM 10 MG/1
10 TABLET ORAL DAILY
Qty: 90 TABLET | Refills: 1 | OUTPATIENT
Start: 2022-10-07

## 2022-10-07 RX ORDER — LOSARTAN POTASSIUM 50 MG/1
50 TABLET ORAL DAILY
Qty: 90 TABLET | Refills: 1 | OUTPATIENT
Start: 2022-10-07

## 2022-10-13 ENCOUNTER — HOSPITAL ENCOUNTER (OUTPATIENT)
Dept: LAB | Facility: MEDICAL CENTER | Age: 52
End: 2022-10-13
Payer: COMMERCIAL

## 2022-10-13 DIAGNOSIS — E11.9 TYPE 2 DIABETES MELLITUS WITHOUT COMPLICATION, WITHOUT LONG-TERM CURRENT USE OF INSULIN (HCC): ICD-10-CM

## 2022-10-13 DIAGNOSIS — E78.5 DYSLIPIDEMIA: ICD-10-CM

## 2022-10-13 LAB
ALBUMIN SERPL BCP-MCNC: 4.3 G/DL (ref 3.2–4.9)
ALBUMIN/GLOB SERPL: 1.5 G/DL
ALP SERPL-CCNC: 95 U/L (ref 30–99)
ALT SERPL-CCNC: 23 U/L (ref 2–50)
ANION GAP SERPL CALC-SCNC: 11 MMOL/L (ref 7–16)
AST SERPL-CCNC: 14 U/L (ref 12–45)
BILIRUB SERPL-MCNC: 0.2 MG/DL (ref 0.1–1.5)
BUN SERPL-MCNC: 12 MG/DL (ref 8–22)
CALCIUM SERPL-MCNC: 9.4 MG/DL (ref 8.4–10.2)
CHLORIDE SERPL-SCNC: 101 MMOL/L (ref 96–112)
CHOLEST SERPL-MCNC: 250 MG/DL (ref 100–199)
CO2 SERPL-SCNC: 25 MMOL/L (ref 20–33)
CREAT SERPL-MCNC: 0.53 MG/DL (ref 0.5–1.4)
FASTING STATUS PATIENT QL REPORTED: NORMAL
GFR SERPLBLD CREATININE-BSD FMLA CKD-EPI: 113 ML/MIN/1.73 M 2
GLOBULIN SER CALC-MCNC: 2.8 G/DL (ref 1.9–3.5)
GLUCOSE SERPL-MCNC: 138 MG/DL (ref 65–99)
HDLC SERPL-MCNC: 62 MG/DL
LDLC SERPL CALC-MCNC: 163 MG/DL
POTASSIUM SERPL-SCNC: 4.1 MMOL/L (ref 3.6–5.5)
PROT SERPL-MCNC: 7.1 G/DL (ref 6–8.2)
SODIUM SERPL-SCNC: 137 MMOL/L (ref 135–145)
TRIGL SERPL-MCNC: 124 MG/DL (ref 0–149)

## 2022-10-13 PROCEDURE — 80053 COMPREHEN METABOLIC PANEL: CPT

## 2022-10-13 PROCEDURE — 36415 COLL VENOUS BLD VENIPUNCTURE: CPT

## 2022-10-13 PROCEDURE — 80061 LIPID PANEL: CPT

## 2022-10-31 DIAGNOSIS — E78.5 DYSLIPIDEMIA: ICD-10-CM

## 2022-10-31 NOTE — TELEPHONE ENCOUNTER
Last seen: 7/28/22 by Dr. Jayda Patel appt: none     Does patient have an active prescription for medications requested? No    Received Request Via: Pharmacy

## 2022-11-02 RX ORDER — ATORVASTATIN CALCIUM 20 MG/1
20 TABLET, FILM COATED ORAL NIGHTLY
Qty: 90 TABLET | Refills: 2 | Status: SHIPPED | OUTPATIENT
Start: 2022-11-02

## 2022-11-22 DIAGNOSIS — F41.9 ANXIETY: ICD-10-CM

## 2022-11-22 RX ORDER — FLUOXETINE HYDROCHLORIDE 20 MG/1
20 CAPSULE ORAL DAILY
Qty: 90 CAPSULE | Refills: 1 | Status: SHIPPED | OUTPATIENT
Start: 2022-11-22

## 2022-11-22 NOTE — TELEPHONE ENCOUNTER
Received request via: Pharmacy    Was the patient seen in the last year in this department? Yes    Does the patient have an active prescription (recently filled or refills available) for medication(s) requested? Yes  Does the patient have retirement Plus and need 100 day supply (blood pressure, diabetes and cholesterol meds only)? Patient does not have SCP

## 2022-11-29 NOTE — TELEPHONE ENCOUNTER
Metformin Refill     Received request via: Pharmacy    Was the patient seen in the last year in this department? Yes    Does the patient have an active prescription (recently filled or refills available) for medication(s) requested? No    Does the patient have correction Plus and need 100 day supply (blood pressure, diabetes and cholesterol meds only)? Patient does not have SCP

## 2023-04-26 NOTE — ASSESSMENT & PLAN NOTE
Patient has new diagnosis of reactive airway disease  -this is an unclear diagnosis, her breathing is improved with albuterol and steroids after a recent exacerbation. It is possible that obesity hypoventilation, recent marijuana vaping hx, and allergies are playing a role as she does not describe childhood asthma  -uses albuterol 4x/day but only because that is how it was prescribed to her    Plan:  -Continue singulair with flonase PRN for now  -Dec albuterol to PRN to establish how often she needs it  -PFTs   No

## 2023-08-24 ENCOUNTER — PATIENT MESSAGE (OUTPATIENT)
Dept: HEALTH INFORMATION MANAGEMENT | Facility: OTHER | Age: 53
End: 2023-08-24

## 2024-07-26 ENCOUNTER — TELEPHONE (OUTPATIENT)
Dept: CARDIOLOGY | Facility: MEDICAL CENTER | Age: 54
End: 2024-07-26
Payer: COMMERCIAL